# Patient Record
Sex: FEMALE | Race: WHITE | URBAN - METROPOLITAN AREA
[De-identification: names, ages, dates, MRNs, and addresses within clinical notes are randomized per-mention and may not be internally consistent; named-entity substitution may affect disease eponyms.]

---

## 2023-02-27 ENCOUNTER — OFFICE VISIT (OUTPATIENT)
Dept: URBAN - METROPOLITAN AREA CLINIC 90 | Facility: CLINIC | Age: 43
End: 2023-02-27

## 2023-02-27 DIAGNOSIS — L03.213 PRESEPTAL CELLULITIS: Primary | ICD-10-CM

## 2023-02-27 PROCEDURE — 99203 OFFICE O/P NEW LOW 30 MIN: CPT | Performed by: OPHTHALMOLOGY

## 2023-02-27 RX ORDER — NEOMYCIN SULFATE, POLYMYXIN B SULFATE AND DEXAMETHASONE 3.5; 10000; 1 MG/ML; [USP'U]/ML; MG/ML
SUSPENSION OPHTHALMIC
Qty: 5 | Refills: 0 | Status: ACTIVE
Start: 2023-02-27

## 2023-02-27 NOTE — IMPRESSION/PLAN
Impression: Preseptal cellulitis: P43.799. Plan: Preseptal cellulitis OD - The patient's eyelids were inflamed bilaterally due to an infection. The deeper orbital tissues were not involved. Treatment with oral and topical antibiotics and warm compresses were recommended, and very close follow up was scheduled. Start Maxitrol gtts TID-QID OD, and d/c E-mycin yana.

## 2023-03-06 ENCOUNTER — OFFICE VISIT (OUTPATIENT)
Dept: URBAN - METROPOLITAN AREA CLINIC 90 | Facility: CLINIC | Age: 43
End: 2023-03-06

## 2023-03-06 DIAGNOSIS — L03.213 PRESEPTAL CELLULITIS: Primary | ICD-10-CM

## 2023-03-06 PROCEDURE — 99212 OFFICE O/P EST SF 10 MIN: CPT | Performed by: OPHTHALMOLOGY

## 2023-03-06 ASSESSMENT — VISUAL ACUITY
OD: 20/20
OS: 20/20

## 2023-03-06 NOTE — IMPRESSION/PLAN
Impression: Preseptal cellulitis: Y73.629. Plan: Resolved on exam. D/c medication. Instructed to return if symptoms reoccur. Vision OD still slightly blurred. Likely due to [facility] use. Pt to continue warm compresses. Will have patient return in 2 weeks for follow up and refraction.